# Patient Record
Sex: FEMALE | Race: WHITE | NOT HISPANIC OR LATINO | Employment: FULL TIME | URBAN - METROPOLITAN AREA
[De-identification: names, ages, dates, MRNs, and addresses within clinical notes are randomized per-mention and may not be internally consistent; named-entity substitution may affect disease eponyms.]

---

## 2020-12-27 ENCOUNTER — NURSE TRIAGE (OUTPATIENT)
Dept: ADMINISTRATIVE | Facility: CLINIC | Age: 40
End: 2020-12-27

## 2020-12-27 NOTE — TELEPHONE ENCOUNTER
"Pt calling regarding swelling in her arm from her elbow down. States she woke up and her arm was swollen. Reports red dots on her forearm and a constant ache. Per protocol, advised pt to go to ED for eval.     Reason for Disposition   [1] Age > 40 AND [2] no obvious cause AND [3] pain even when not moving the arm    (Exception: pain is clearly made worse by moving arm or bending neck)    Additional Information   Negative: Shock suspected (e.g., cold/pale/clammy skin, too weak to stand, low BP, rapid pulse)   Negative: [1] Similar pain previously AND [2] it was from "heart attack"   Negative: [1] Similar pain previously AND [2] it was from "angina" AND [3] not relieved by nitroglycerin   Negative: Sounds like a life-threatening emergency to the triager   Negative: Difficulty breathing or unusual sweating (e.g., sweating without exertion)   Negative: [1] Age > 40 AND [2] associated chest or jaw pain AND [3] pain lasts > 5 minutes    Protocols used: ARM PAIN-A-AH      "

## 2022-10-05 PROBLEM — M54.2 CERVICALGIA: Status: ACTIVE | Noted: 2022-10-05

## 2022-10-06 PROBLEM — M54.50 LOW BACK PAIN RADIATING TO BOTH LEGS: Status: ACTIVE | Noted: 2022-10-06

## 2022-10-06 PROBLEM — M51.36 DEGENERATIVE DISC DISEASE, LUMBAR: Status: ACTIVE | Noted: 2022-10-06

## 2022-10-06 PROBLEM — M79.605 LOW BACK PAIN RADIATING TO BOTH LEGS: Status: ACTIVE | Noted: 2022-10-06

## 2022-10-06 PROBLEM — R20.2 NUMBNESS AND TINGLING: Status: ACTIVE | Noted: 2022-10-06

## 2022-10-06 PROBLEM — M79.604 LOW BACK PAIN RADIATING TO BOTH LEGS: Status: ACTIVE | Noted: 2022-10-06

## 2022-10-06 PROBLEM — R20.0 NUMBNESS AND TINGLING: Status: ACTIVE | Noted: 2022-10-06

## 2022-11-28 PROBLEM — J35.1 HYPERTROPHY OF TONSILS: Status: ACTIVE | Noted: 2022-11-28

## 2022-11-28 PROBLEM — G47.33 OBSTRUCTIVE SLEEP APNEA: Status: ACTIVE | Noted: 2022-11-28

## 2023-10-11 PROBLEM — M62.838 MUSCLE SPASM: Status: ACTIVE | Noted: 2023-10-11

## 2023-10-11 PROBLEM — R42 LIGHTHEADEDNESS: Status: ACTIVE | Noted: 2023-10-11

## 2023-10-11 PROBLEM — G44.52 HEADACHE, NEW DAILY PERSISTENT (NDPH): Status: ACTIVE | Noted: 2023-10-11
